# Patient Record
Sex: FEMALE | URBAN - METROPOLITAN AREA
[De-identification: names, ages, dates, MRNs, and addresses within clinical notes are randomized per-mention and may not be internally consistent; named-entity substitution may affect disease eponyms.]

---

## 2017-06-05 ENCOUNTER — TELEPHONE (OUTPATIENT)
Dept: FAMILY MEDICINE CLINIC | Facility: CLINIC | Age: 37
End: 2017-06-05

## 2017-06-05 NOTE — TELEPHONE ENCOUNTER
----- Message from Ciera Toth sent at 6/5/2017  8:15 AM EDT -----  Contact: ADRIANA  PATIENT SYMPTOMS HAVE GOTTEN A LOST WORSE,   MEMORY LOSS  TINGLING AND SHAKING ON RIGHT SIDE    ASKING IF HER MEDICATIONS COULD BE CAUSING THIS.    [phone]